# Patient Record
Sex: FEMALE | Race: WHITE | Employment: PART TIME | URBAN - METROPOLITAN AREA
[De-identification: names, ages, dates, MRNs, and addresses within clinical notes are randomized per-mention and may not be internally consistent; named-entity substitution may affect disease eponyms.]

---

## 2017-03-13 ENCOUNTER — TELEPHONE (OUTPATIENT)
Dept: INTERNAL MEDICINE CLINIC | Facility: CLINIC | Age: 37
End: 2017-03-13

## 2017-03-13 DIAGNOSIS — E05.90 HYPERTHYROIDISM: Primary | ICD-10-CM

## 2017-03-28 ENCOUNTER — HOSPITAL ENCOUNTER (OUTPATIENT)
Age: 37
Discharge: HOME | End: 2017-03-28
Attending: EMERGENCY MEDICINE
Payer: MEDICAID

## 2017-03-28 ENCOUNTER — APPOINTMENT (OUTPATIENT)
Dept: LAB | Age: 37
End: 2017-03-28
Attending: INTERNAL MEDICINE
Payer: MEDICAID

## 2017-03-28 ENCOUNTER — TELEPHONE (OUTPATIENT)
Dept: ENDOCRINOLOGY CLINIC | Facility: CLINIC | Age: 37
End: 2017-03-28

## 2017-03-28 VITALS
DIASTOLIC BLOOD PRESSURE: 80 MMHG | OXYGEN SATURATION: 100 % | BODY MASS INDEX: 26.58 KG/M2 | TEMPERATURE: 98 F | RESPIRATION RATE: 18 BRPM | WEIGHT: 150 LBS | SYSTOLIC BLOOD PRESSURE: 108 MMHG | HEIGHT: 63 IN | HEART RATE: 64 BPM

## 2017-03-28 DIAGNOSIS — R42 VERTIGO: ICD-10-CM

## 2017-03-28 DIAGNOSIS — E89.0 POSTOPERATIVE HYPOTHYROIDISM: ICD-10-CM

## 2017-03-28 DIAGNOSIS — E03.9 HYPOTHYROIDISM, UNSPECIFIED TYPE: Primary | ICD-10-CM

## 2017-03-28 LAB
T4 FREE SERPL-MCNC: 0.27 NG/DL (ref 0.58–1.64)
TSH SERPL-ACNC: 208.88 UIU/ML (ref 0.34–5.6)

## 2017-03-28 PROCEDURE — 84439 ASSAY OF FREE THYROXINE: CPT

## 2017-03-28 PROCEDURE — 99213 OFFICE O/P EST LOW 20 MIN: CPT

## 2017-03-28 PROCEDURE — 36415 COLL VENOUS BLD VENIPUNCTURE: CPT

## 2017-03-28 PROCEDURE — 84443 ASSAY THYROID STIM HORMONE: CPT

## 2017-03-28 PROCEDURE — 99214 OFFICE O/P EST MOD 30 MIN: CPT

## 2017-03-28 RX ORDER — NAPROXEN 500 MG/1
500 TABLET ORAL 2 TIMES DAILY PRN
Qty: 14 TABLET | Refills: 0 | Status: ON HOLD | OUTPATIENT
Start: 2017-03-28 | End: 2017-03-31

## 2017-03-28 RX ORDER — LEVOTHYROXINE SODIUM 112 UG/1
112 TABLET ORAL
Qty: 30 TABLET | Refills: 3 | Status: ON HOLD | OUTPATIENT
Start: 2017-03-28 | End: 2017-03-31

## 2017-03-28 RX ORDER — MECLIZINE HCL 12.5 MG/1
25 TABLET ORAL 3 TIMES DAILY PRN
Qty: 20 TABLET | Refills: 0 | Status: SHIPPED | OUTPATIENT
Start: 2017-03-28 | End: 2017-05-03

## 2017-03-28 NOTE — ED NOTES
Concerned about \"thyroid problem\". Without her meds for several weeks. Called today and her doctor is out of town. Told by office nurse to come here for evaluation.

## 2017-03-28 NOTE — ED INITIAL ASSESSMENT (HPI)
3/27/17 c/o dizzy, headache, fatigued. No fever. Denies congestion or sore throat. No N/V/D. Took claritin for \"allergy symptoms\". Ran out of thyroid meds several weeks ago. Dr. Keller Sink out of town.

## 2017-03-28 NOTE — TELEPHONE ENCOUNTER
Returned call to Jose Marlow. She states she was supposed to repeat labs and follow up with our office in December but did not follow up at that time.  She was feeling for a while that she might by hyperthyroid- having hot flashes and anxiety so decreased dose o

## 2017-03-28 NOTE — TELEPHONE ENCOUNTER
Returned call to [facility]. She went for bloodwork and was also seen in urgent care. They told her she is likely experiencing symptoms of hypothyroidism given she has been off medication for several weeks.  Informed patient she should be sure to restart medica

## 2017-03-28 NOTE — TELEPHONE ENCOUNTER
Pt. States that she is not feeling well. She states that she is feeling extremely lethargic, mouth is numb, and having a really bad headache. Pt. States that she is not sure if it is because of not taking her Levothyroxine medication?  I transferred the charley

## 2017-03-28 NOTE — ED PROVIDER NOTES
[facility]    History   Patient presents with:  Dizziness    Stated Complaint: TIRED    HPI    20-year-old female patient presents complaining of generalized weakness, mild low back pain and diffuse body times daily.    misoprostol (CYTOTEC) 200 MCG Oral Tab,  Take 2 tablets night prior to procedure       Family History   Problem Relation Age of Onset   • Lipids Father    • Hypertension Father    • Heart Disease Father      CAD; per NG   • Heart Disorder Pa reflexes, cerebellar exam.  Psych: Appropriate, not agitated      ED Course   Labs Reviewed - No data to display    MDM     I explained the patient to go to emergency department immediately if she has worsening symptoms, shortness of breath, dizziness or a

## 2017-03-29 ENCOUNTER — APPOINTMENT (OUTPATIENT)
Dept: CT IMAGING | Facility: HOSPITAL | Age: 37
DRG: 644 | End: 2017-03-29
Attending: EMERGENCY MEDICINE
Payer: MEDICAID

## 2017-03-29 ENCOUNTER — HOSPITAL ENCOUNTER (INPATIENT)
Facility: HOSPITAL | Age: 37
LOS: 2 days | Discharge: HOME | DRG: 644 | End: 2017-03-31
Attending: EMERGENCY MEDICINE | Admitting: HOSPITALIST
Payer: MEDICAID

## 2017-03-29 DIAGNOSIS — N17.9 ACUTE RENAL FAILURE, UNSPECIFIED ACUTE RENAL FAILURE TYPE (HCC): ICD-10-CM

## 2017-03-29 DIAGNOSIS — R53.1 WEAKNESS: ICD-10-CM

## 2017-03-29 DIAGNOSIS — E03.9 HYPOTHYROIDISM, UNSPECIFIED TYPE: Primary | ICD-10-CM

## 2017-03-29 PROCEDURE — 99220 INITIAL OBSERVATION CARE,LEVL III: CPT | Performed by: HOSPITALIST

## 2017-03-29 PROCEDURE — 70450 CT HEAD/BRAIN W/O DYE: CPT

## 2017-03-29 RX ORDER — MECLIZINE HYDROCHLORIDE 25 MG/1
25 TABLET ORAL 3 TIMES DAILY PRN
Status: DISCONTINUED | OUTPATIENT
Start: 2017-03-29 | End: 2017-03-31

## 2017-03-29 RX ORDER — POTASSIUM CHLORIDE 20 MEQ/1
40 TABLET, EXTENDED RELEASE ORAL ONCE
Status: COMPLETED | OUTPATIENT
Start: 2017-03-29 | End: 2017-03-29

## 2017-03-29 RX ORDER — ACETAMINOPHEN 325 MG/1
650 TABLET ORAL EVERY 4 HOURS PRN
Status: DISCONTINUED | OUTPATIENT
Start: 2017-03-29 | End: 2017-03-31

## 2017-03-29 RX ORDER — ONDANSETRON 2 MG/ML
4 INJECTION INTRAMUSCULAR; INTRAVENOUS EVERY 6 HOURS PRN
Status: DISCONTINUED | OUTPATIENT
Start: 2017-03-29 | End: 2017-03-31

## 2017-03-29 RX ORDER — LIOTHYRONINE SODIUM 5 UG/1
5 TABLET ORAL 2 TIMES DAILY
Status: DISCONTINUED | OUTPATIENT
Start: 2017-03-29 | End: 2017-03-30

## 2017-03-29 RX ORDER — HEPARIN SODIUM 5000 [USP'U]/ML
5000 INJECTION, SOLUTION INTRAVENOUS; SUBCUTANEOUS EVERY 12 HOURS
Status: DISCONTINUED | OUTPATIENT
Start: 2017-03-29 | End: 2017-03-31

## 2017-03-29 RX ORDER — SODIUM CHLORIDE 450 MG/100ML
INJECTION, SOLUTION INTRAVENOUS CONTINUOUS
Status: DISCONTINUED | OUTPATIENT
Start: 2017-03-29 | End: 2017-03-31

## 2017-03-29 NOTE — TELEPHONE ENCOUNTER
Dr. Erwin Ariza please review below. Spoke to you yesterday regarding this patient's symptoms. I did inform her she needs to restart LT4 right away given symptoms. See test results done yesterday.  Does anything else need to be done or just restarting LT4 th

## 2017-03-29 NOTE — TELEPHONE ENCOUNTER
Results for Romain Daughters (MRN [id number]) as of 3/29/2017 09:18   Ref.  Range 10/6/2016 14:16 3/28/2017 16:40   T4,Free (Direct) Latest Ref Range: 0.58-1.64 ng/dL 0.72 0.27 (L)   TSH Latest Ref Range: 0.34-5.60 uIU/mL 13.48 (H) 208.88 (H)   T3 FREE Late

## 2017-03-29 NOTE — ED PROVIDER NOTES
Patient Seen in: [facility] AND [facility] Emergency Department    History   Patient presents with:  Abnormal Result (metabolic, cardiac)  Numbness Weakness (neurologic)    Stated Complaint:     HPI    Patient presents the emergency department complaining of wea Lipids Father    • Hypertension Father    • Heart Disease Father      CAD; per NG   • Heart Disorder Paternal Grandfather    • Pulmonary Disease Mother      COPD; per NG   • Blood Disorder Mother      Bone Marrow Problem; per NG   • Cataracts Mother    • K place, and time. No cranial nerve deficit or sensory deficit. Skin: Skin is warm and dry. No rash noted. Nursing note and vitals reviewed.           ED Course     Labs Reviewed   BASIC METABOLIC PANEL (8) - Abnormal; Notable for the following:     GFR, diagnosis)  Weakness    Disposition:  Admit    Follow-up:  No follow-up provider specified.     Medications Prescribed:  Current Discharge Medication List        Present on Admission  Date Reviewed: 4/27/2016          ICD-10-CM Noted POA    Hypothyroidism E

## 2017-03-29 NOTE — ED INITIAL ASSESSMENT (HPI)
Patient sent over by PMD for abnormal TSH level. Patient c/o generalized weakness, headache worsening over the last 2-3 days.

## 2017-03-29 NOTE — H&P
[facility]    PATIENT'S NAME: Arclifford Katelyn   ATTENDING PHYSICIAN: Fazal Amin MD   PATIENT ACCOUNT#:   [id number]    LOCATION:  [place] [address]  MEDICAL RECORD #:   [id number]       YOB: 1980  ADMISSION DATE:       03/29/ blood pressure 117/81, pulse oximetry 99% on room air. HEENT:  Atraumatic. Oropharynx clear with dry mucous membranes. Normal hard and soft palate. NECK:  Trachea midline. Full range of motion. Supple. No thyromegaly or lymphadenopathy.     LUNGS:

## 2017-03-29 NOTE — TELEPHONE ENCOUNTER
Spoke with Freddy Varghese. She states she is feeling worse this morning. Discussed that TSH very elevated and per AM's advice patient should to to ED to be evaluated as she may need IV LT4 to help improve levels.  Patient is currently going to call Boyfriend to ran

## 2017-03-30 PROCEDURE — 99233 SBSQ HOSP IP/OBS HIGH 50: CPT | Performed by: HOSPITALIST

## 2017-03-30 PROCEDURE — 99222 1ST HOSP IP/OBS MODERATE 55: CPT | Performed by: INTERNAL MEDICINE

## 2017-03-30 NOTE — PROGRESS NOTES
[facility]    Progress Note    Aditi Perez Patient Status:  Inpatient    1980 MRN [id number]   Location [facility] 5SW/SE Attending Ronold Castleman, MD   Hosp Day # 1 PCP Keith Huston MD       Subjective:   Joceline Gallego Brain Or Head ([id number])    3/29/2017  CONCLUSION: Normal examination.                   Assessment and Plan:     Hypothyroidism, unspecified type  -untreated  -due to gut edema treating with iv replacement x 2-3 days  -transition to oral meds per endo    Weak

## 2017-03-30 NOTE — PROGRESS NOTES
[facility]    Progress Note    Aditi Perez Patient Status:  Inpatient    1980 MRN [id number]   Location [facility] 5SW/SE Attending Ronold Castleman, MD   Hosp Day # 1 PCP Keith Huston MD     Subjective:  Feels lexi

## 2017-03-30 NOTE — PLAN OF CARE
Problem: Patient Centered Care  Goal: Patient preferences are identified and integrated in the patient’s plan of care  Interventions:  - What would you like us to know as we care for you?  Surgical history-Thyroidectomy  - Provide timely, complete, and accu and behaviors that affect risk of falls.   - [place] fall precautions as indicated by assessment.  - Educate pt/family on patient safety including physical limitations  - Instruct pt to call for assistance with activity based on assessment  - Modify envir

## 2017-03-30 NOTE — CONSULTS
[facility]    Report of Consultation    Hodan Quitter Patient Status:  Inpatient    1980 MRN [id number]   Location [facility] 5SW/SE Attending Gale Bernard MD   Hosp Day # 1 PCP Karrie Butler MD     Date of Admissi Disease Sister      Kidney stones; per NG   • Anemia Brother      per NG   • Glaucoma Neg    • Diabetes Neg    • Macular degeneration Neg    • Cancer Maternal Grandmother       reports that she quit smoking about 12 years ago.  Her smoking use included Ciga headaches     Nausea     Eye pain     Pelvic pain in female     Bilateral ovarian cysts     Hot flashes     Physical exam, annual     Menopause     Heart palpitations     Anxiety     MGD (meibomian gland disease)     Anisometropic amblyopia of right eye

## 2017-03-30 NOTE — PLAN OF CARE
Pharmacist notified RN that Liothyroine 5 mcg is not in stock. Patient asked to bring in home dose in original packaging. Patient agreeable to plan. Will have family bring home dose in.

## 2017-03-30 NOTE — PLAN OF CARE
Problem: Patient Centered Care  Goal: Patient preferences are identified and integrated in the patient’s plan of care  Interventions:  - What would you like us to know as we care for you?  Surgical history-Thyroidectomy  - Provide timely, complete, and accu but appears more comfortable with PRN meds. Problem: SAFETY ADULT - FALL  Goal: Free from fall injury  INTERVENTIONS:  - Assess pt frequently for physical needs  - Identify cognitive and physical deficits and behaviors that affect risk of falls.   - Inst

## 2017-03-31 VITALS
SYSTOLIC BLOOD PRESSURE: 108 MMHG | TEMPERATURE: 98 F | RESPIRATION RATE: 17 BRPM | BODY MASS INDEX: 26.58 KG/M2 | OXYGEN SATURATION: 97 % | HEIGHT: 63 IN | DIASTOLIC BLOOD PRESSURE: 61 MMHG | HEART RATE: 66 BPM | WEIGHT: 150 LBS

## 2017-03-31 PROCEDURE — 99231 SBSQ HOSP IP/OBS SF/LOW 25: CPT | Performed by: INTERNAL MEDICINE

## 2017-03-31 PROCEDURE — 99239 HOSP IP/OBS DSCHRG MGMT >30: CPT | Performed by: HOSPITALIST

## 2017-03-31 RX ORDER — HYDROCODONE BITARTRATE AND ACETAMINOPHEN 7.5; 325 MG/1; MG/1
1 TABLET ORAL EVERY 6 HOURS PRN
Status: DISCONTINUED | OUTPATIENT
Start: 2017-03-31 | End: 2017-03-31

## 2017-03-31 RX ORDER — ACETAMINOPHEN 325 MG/1
650 TABLET ORAL EVERY 4 HOURS PRN
Qty: 30 TABLET | Refills: 0 | Status: SHIPPED | OUTPATIENT
Start: 2017-03-31

## 2017-03-31 RX ORDER — LEVOTHYROXINE SODIUM 112 UG/1
112 TABLET ORAL
Qty: 30 TABLET | Refills: 3 | Status: SHIPPED | OUTPATIENT
Start: 2017-03-31 | End: 2018-08-02

## 2017-03-31 NOTE — PROGRESS NOTES
[facility]    Progress Note    Tanya Martins Patient Status:  Inpatient    1980 MRN [id number]   Location [facility] 5SW/SE Attending Sonny Sprague MD   Hosp Day # 2 PCP Shaye Martel MD       Subjective:   Jeanine Cross  03/31/2017       Ct Brain Or Head ([id number])    3/29/2017  CONCLUSION: Normal examination.                   Assessment and Plan:   Hypothyroidism, unspecified type  -untreated for months  -due to gut edema treating with iv replacement x 2-3 days  -tr

## 2017-03-31 NOTE — PLAN OF CARE
Pt discharged home in stable condition with all of her belongings accompanied by rocael. Discharge instructions given to pt/kingsleyance. Pt verbalized understanding.  IV and Tele discontinued

## 2017-03-31 NOTE — PLAN OF CARE
Problem: Patient Centered Care  Goal: Patient preferences are identified and integrated in the patient’s plan of care  Interventions:  - What would you like us to know as we care for you?  Surgical history-Thyroidectomy  - Provide timely, complete, and accu ADULT - FALL  Goal: Free from fall injury  INTERVENTIONS:  - Assess pt frequently for physical needs  - Identify cognitive and physical deficits and behaviors that affect risk of falls.   - [place] fall precautions as indicated by assessment.  - Educate p

## 2017-03-31 NOTE — PROGRESS NOTES
39year old female with uncontrolled hypothyroidism secondary to non compliance  She has been started on iv synthroid 100 mcg daily. We will repeat FT 4 on Sunday. We will follow.

## 2017-03-31 NOTE — PAYOR COMM NOTE
Review Type: ADMISSION   Reviewer: Augie Marroquin       Date: March 31, 2017 - 11:09 AM  Payor: Flaco Huynh  Authorization Number: N/A  Admit date: 3/29/2017  1:54 PM   Admitted from Emergency Dept.:       Patient Seen in: [address] [place] [address] BASIC METABOLIC PANEL (8) - Abnormal; Notable for the following:     GFR, Non- 43 (*)     GFR, -American 52 (*)     All other components within normal limits   TSH W REFLEX TO FREE T4 - Abnormal; Notable for the following:     TSH activities of daily living. REVIEW OF SYSTEMS:  Generalized fatigue, weakness, decreased energy, hypersomnia, intolerance to cold, constipation. Patient denies any fever or chills. No chest pain. Other 12-point review of systems is negative. clear with dry mucous membranes.  Normal hard and soft palate.     NECK:  Trachea midline.  Full range of motion.  Supple.  No thyromegaly or lymphadenopathy.     LUNGS:  Clear to auscultation bilaterally.  Normal respiratory effort.  No intercostal retrac medication, given that patient's with uncontrolled hypothyroidism can have gut edema which impairs absorption of oral medication     PLAN:  - c/w synthroid 100 mcg iv  - Will plan to transition to oral therapy after 2-3 days      3/31/17  Subjective:    Al treating with iv replacement x 2-3 days  -transition to oral meds per endo    Weakness  -secondary to above    Acute Renal Failure  -likely related pre-renal vs. Mild rhabdomyolysis  -cpk slightly elevated  -was on a hike prior to symtpoms  -cont to helena

## 2017-03-31 NOTE — DISCHARGE SUMMARY
[facility] AND [facility]  Discharge Summary    Kelley Silva Patient Status:  Inpatient    1980 MRN [id number]   Location [facility] 5SW/SE Attending Theodora Birmingham MD   Hosp Day # 2 PCP Tunde Jacobo MD     Date of Admission: 3/29/2017    D Dr. Vicente Alonso ADMISSION:  Hypothyroidism, bradycardia, and mild renal insufficiency.      HISTORY OF PRESENT ILLNESS:  The patient is a 43-year-old  female who stopped taking her thyroid medications because she felt hyperthyroid and h

## 2017-04-01 ENCOUNTER — TELEPHONE (OUTPATIENT)
Dept: ENDOCRINOLOGY CLINIC | Facility: CLINIC | Age: 37
End: 2017-04-01

## 2017-04-01 DIAGNOSIS — E03.9 HYPOTHYROIDISM, UNSPECIFIED TYPE: Primary | ICD-10-CM

## 2017-04-03 ENCOUNTER — APPOINTMENT (OUTPATIENT)
Dept: LAB | Age: 37
End: 2017-04-03
Attending: INTERNAL MEDICINE
Payer: MEDICAID

## 2017-04-03 ENCOUNTER — TELEPHONE (OUTPATIENT)
Dept: INTERNAL MEDICINE UNIT | Facility: HOSPITAL | Age: 37
End: 2017-04-03

## 2017-04-03 ENCOUNTER — TELEPHONE (OUTPATIENT)
Dept: ENDOCRINOLOGY CLINIC | Facility: CLINIC | Age: 37
End: 2017-04-03

## 2017-04-03 DIAGNOSIS — E03.9 HYPOTHYROIDISM, UNSPECIFIED TYPE: ICD-10-CM

## 2017-04-03 PROCEDURE — 84439 ASSAY OF FREE THYROXINE: CPT

## 2017-04-03 PROCEDURE — 36415 COLL VENOUS BLD VENIPUNCTURE: CPT

## 2017-04-03 NOTE — TELEPHONE ENCOUNTER
Reviewed patient's recent hospitalization due to severe hypothyroidism, please make sure that she is going for lab this week which was ordered by Dr. Margaux Sanchez.

## 2017-04-03 NOTE — TELEPHONE ENCOUNTER
Patient discharged from [facility] on March 31, 2017. Please call patient to schedule hospital follow-up appointment with PCP, Dr. Lorraine Tomlinson or whom ever the patient wishes to follow-up with.

## 2017-04-05 ENCOUNTER — TELEPHONE (OUTPATIENT)
Dept: ENDOCRINOLOGY CLINIC | Facility: CLINIC | Age: 37
End: 2017-04-05

## 2017-04-05 DIAGNOSIS — E03.9 HYPOTHYROIDISM, UNSPECIFIED TYPE: Primary | ICD-10-CM

## 2017-04-05 NOTE — TELEPHONE ENCOUNTER
Please call patient - thyroid level is significantly improved. Continue current dose of medication and repeat TSH, FT4 before upcoming appointment.

## 2017-04-05 NOTE — TELEPHONE ENCOUNTER
Called Anneliese Perez and let her know per [facility] thyroid levels are significantly improved. Understands to continue current dose of medication and will repeat labs before upcoming appt. Orders replaced.

## 2017-04-07 ENCOUNTER — LAB ENCOUNTER (OUTPATIENT)
Dept: LAB | Age: 37
End: 2017-04-07
Attending: INTERNAL MEDICINE
Payer: MEDICAID

## 2017-04-07 ENCOUNTER — OFFICE VISIT (OUTPATIENT)
Dept: INTERNAL MEDICINE CLINIC | Facility: CLINIC | Age: 37
End: 2017-04-07

## 2017-04-07 ENCOUNTER — HOSPITAL ENCOUNTER (OUTPATIENT)
Dept: GENERAL RADIOLOGY | Age: 37
Discharge: HOME | End: 2017-04-07
Attending: INTERNAL MEDICINE
Payer: MEDICAID

## 2017-04-07 VITALS
SYSTOLIC BLOOD PRESSURE: 114 MMHG | WEIGHT: 154 LBS | BODY MASS INDEX: 27 KG/M2 | DIASTOLIC BLOOD PRESSURE: 76 MMHG | RESPIRATION RATE: 18 BRPM | TEMPERATURE: 98 F | HEART RATE: 71 BPM

## 2017-04-07 DIAGNOSIS — E03.9 HYPOTHYROIDISM, UNSPECIFIED TYPE: Primary | ICD-10-CM

## 2017-04-07 DIAGNOSIS — E03.9 HYPOTHYROIDISM, UNSPECIFIED TYPE: ICD-10-CM

## 2017-04-07 DIAGNOSIS — S16.1XXA CERVICAL MYOFASCIAL STRAIN, INITIAL ENCOUNTER: ICD-10-CM

## 2017-04-07 DIAGNOSIS — M54.41 CHRONIC RIGHT-SIDED LOW BACK PAIN WITH RIGHT-SIDED SCIATICA: ICD-10-CM

## 2017-04-07 DIAGNOSIS — G89.29 CHRONIC RIGHT-SIDED LOW BACK PAIN WITH RIGHT-SIDED SCIATICA: ICD-10-CM

## 2017-04-07 DIAGNOSIS — S29.019A STRAIN OF THORACIC REGION, INITIAL ENCOUNTER: ICD-10-CM

## 2017-04-07 DIAGNOSIS — E55.9 VITAMIN D DEFICIENCY: ICD-10-CM

## 2017-04-07 PROCEDURE — 82306 VITAMIN D 25 HYDROXY: CPT

## 2017-04-07 PROCEDURE — 84439 ASSAY OF FREE THYROXINE: CPT

## 2017-04-07 PROCEDURE — 36415 COLL VENOUS BLD VENIPUNCTURE: CPT

## 2017-04-07 PROCEDURE — 84443 ASSAY THYROID STIM HORMONE: CPT

## 2017-04-07 PROCEDURE — 99212 OFFICE O/P EST SF 10 MIN: CPT | Performed by: INTERNAL MEDICINE

## 2017-04-07 PROCEDURE — 99214 OFFICE O/P EST MOD 30 MIN: CPT | Performed by: INTERNAL MEDICINE

## 2017-04-07 PROCEDURE — 80053 COMPREHEN METABOLIC PANEL: CPT

## 2017-04-07 PROCEDURE — 72080 X-RAY EXAM THORACOLMB 2/> VW: CPT

## 2017-04-07 PROCEDURE — 82607 VITAMIN B-12: CPT

## 2017-04-07 PROCEDURE — 82550 ASSAY OF CK (CPK): CPT

## 2017-04-07 NOTE — PROGRESS NOTES
HPI:    Patient ID: Thee Finney is a 39year old female patient presents for follow-up after being hospitalized recently    HPI  Patient reports that she was hospitalized recently, she is having hard time with hypothyroidism due to [facility] thyroiditi Positive for weakness. Negative for tremors. Psychiatric/Behavioral: Negative for sleep disturbance. The patient is not nervous/anxious.              Current Outpatient Prescriptions:  acetaminophen 325 MG Oral Tab Take 2 tablets (650 mg total) by mouth e for adjustment of levothyroxine.   Advised patient that many symptoms she has could be related to hypothyroidism  Vitamin d deficiency  Chronic right-sided low back pain with right-sided sciatica advised patient to start physical therapy, follow-up if no im

## 2017-04-09 ENCOUNTER — TELEPHONE (OUTPATIENT)
Dept: INTERNAL MEDICINE CLINIC | Facility: CLINIC | Age: 37
End: 2017-04-09

## 2017-04-09 DIAGNOSIS — K80.20 CALCULUS OF GALLBLADDER WITHOUT CHOLECYSTITIS WITHOUT OBSTRUCTION: Primary | ICD-10-CM

## 2017-04-10 NOTE — TELEPHONE ENCOUNTER
Advised patient of Dr. Lawrence Hernandez notes. Gave patient number to scheduling. Patient verbalized understanding.

## 2017-04-10 NOTE — TELEPHONE ENCOUNTER
Notes Recorded by Bret Zavala MD on 4/9/2017 at 1:49 PM  See x-ray results as well  Notes Recorded by Bret Zavala MD on 4/9/2017 at 1:47 PM  Call patient thyroid function is improving, vitamin B12 low normal, I recommend that she starts taking over-t

## 2017-04-10 NOTE — TELEPHONE ENCOUNTER
----- Message from Adrienne An MD sent at 4/9/2017  1:48 PM CDT -----  X-ray of the thoracic spine is normal, incidentally DC that she might hear a stones in the gallbladder, I recommend that she does ultrasound just to document that I placed order she s

## 2017-04-13 ENCOUNTER — APPOINTMENT (OUTPATIENT)
Dept: LAB | Age: 37
End: 2017-04-13
Attending: INTERNAL MEDICINE
Payer: MEDICAID

## 2017-04-13 DIAGNOSIS — E03.9 HYPOTHYROIDISM, UNSPECIFIED TYPE: ICD-10-CM

## 2017-04-13 PROCEDURE — 36415 COLL VENOUS BLD VENIPUNCTURE: CPT

## 2017-04-13 PROCEDURE — 84439 ASSAY OF FREE THYROXINE: CPT

## 2017-04-13 PROCEDURE — 84443 ASSAY THYROID STIM HORMONE: CPT

## 2017-04-14 ENCOUNTER — OFFICE VISIT (OUTPATIENT)
Dept: ENDOCRINOLOGY CLINIC | Facility: CLINIC | Age: 37
End: 2017-04-14

## 2017-04-14 VITALS
HEIGHT: 63.5 IN | HEART RATE: 69 BPM | WEIGHT: 155.19 LBS | SYSTOLIC BLOOD PRESSURE: 103 MMHG | DIASTOLIC BLOOD PRESSURE: 65 MMHG | BODY MASS INDEX: 27.16 KG/M2

## 2017-04-14 DIAGNOSIS — E89.0 POSTOPERATIVE HYPOTHYROIDISM: Primary | ICD-10-CM

## 2017-04-14 PROCEDURE — 99212 OFFICE O/P EST SF 10 MIN: CPT | Performed by: INTERNAL MEDICINE

## 2017-04-14 PROCEDURE — 99213 OFFICE O/P EST LOW 20 MIN: CPT | Performed by: INTERNAL MEDICINE

## 2017-04-14 NOTE — PROGRESS NOTES
Name: Kelly Soni  Date: 4/14/2017    Referring Physician: Vida Morales    No chief complaint on file. HISTORY OF PRESENT ILLNESS   Kelly Soni is a 39year old female who presents for No chief complaint on file.     40 y/o F presents for fo pain or arthralgia  /Gyne: no frequency or discomfort while urinating  Psychiatric:  no acute distress, anxiety  or depression  Skin: normal moisturized skin    Medications:     Current outpatient prescriptions:   •  acetaminophen 325 MG Oral Tab, Take 2 LAPAROSCOPY,DIAGNOSTIC  08/2000    Comment Ectopic pregnancy       PHYSICAL EXAMINATION:  There were no vitals taken for this visit. General Appearance:  Alert, in no acute distress, well developed  Eyes: normal conjunctivae, sclera.   Ears/Nose/Mouth/Th

## 2017-04-26 ENCOUNTER — HOSPITAL ENCOUNTER (OUTPATIENT)
Dept: ULTRASOUND IMAGING | Age: 37
Discharge: HOME | End: 2017-04-26
Attending: INTERNAL MEDICINE
Payer: MEDICAID

## 2017-04-26 DIAGNOSIS — K80.20 CALCULUS OF GALLBLADDER WITHOUT CHOLECYSTITIS WITHOUT OBSTRUCTION: ICD-10-CM

## 2017-04-26 PROCEDURE — 76705 ECHO EXAM OF ABDOMEN: CPT

## 2017-05-03 ENCOUNTER — OFFICE VISIT (OUTPATIENT)
Dept: INTERNAL MEDICINE CLINIC | Facility: CLINIC | Age: 37
End: 2017-05-03

## 2017-05-03 VITALS
SYSTOLIC BLOOD PRESSURE: 108 MMHG | DIASTOLIC BLOOD PRESSURE: 72 MMHG | WEIGHT: 155 LBS | TEMPERATURE: 98 F | RESPIRATION RATE: 18 BRPM | BODY MASS INDEX: 27 KG/M2 | OXYGEN SATURATION: 96 % | HEART RATE: 67 BPM

## 2017-05-03 DIAGNOSIS — S29.019A STRAIN OF THORACIC REGION, INITIAL ENCOUNTER: ICD-10-CM

## 2017-05-03 DIAGNOSIS — K59.00 CONSTIPATION, UNSPECIFIED CONSTIPATION TYPE: ICD-10-CM

## 2017-05-03 DIAGNOSIS — R10.11 RIGHT UPPER QUADRANT ABDOMINAL PAIN: Primary | ICD-10-CM

## 2017-05-03 PROCEDURE — 99212 OFFICE O/P EST SF 10 MIN: CPT | Performed by: INTERNAL MEDICINE

## 2017-05-03 PROCEDURE — 99213 OFFICE O/P EST LOW 20 MIN: CPT | Performed by: INTERNAL MEDICINE

## 2017-05-03 RX ORDER — POLYETHYLENE GLYCOL 3350 17 G/17G
17 POWDER, FOR SOLUTION ORAL 2 TIMES DAILY
Qty: 60 PACKET | Refills: 1 | Status: SHIPPED | OUTPATIENT
Start: 2017-05-03

## 2017-05-04 NOTE — PROGRESS NOTES
HPI:    Patient ID: Hodan Ceballos is a 39year old female presents for follow-up with constipations, back pains. HPI  Patient is being treated for hypothyroidism, he levels a improving, she states that she is feeling better energy level is improving. tablet Rfl: 3     Allergies:No Known Allergies   /72 mmHg  Pulse 67  Temp(Src) 98.2 °F (36.8 °C) (Oral)  Resp 18  Wt 155 lb (70.308 kg)  SpO2 96%   Physical Exam    Constitutional: She is oriented to person, place, and time.  She appears well-develope INTERNAL         FR#[id number]

## 2017-11-01 RX ORDER — LEVOTHYROXINE SODIUM 112 UG/1
TABLET ORAL
Qty: 30 TABLET | Refills: 0 | Status: SHIPPED | OUTPATIENT
Start: 2017-11-01 | End: 2017-12-01

## 2017-12-01 RX ORDER — LEVOTHYROXINE SODIUM 112 UG/1
TABLET ORAL
Qty: 30 TABLET | Refills: 3 | Status: SHIPPED | OUTPATIENT
Start: 2017-12-01 | End: 2019-05-17

## 2018-08-01 RX ORDER — LEVOTHYROXINE SODIUM 112 UG/1
TABLET ORAL
Qty: 90 TABLET | Refills: 0 | OUTPATIENT
Start: 2018-08-01

## 2018-08-01 RX ORDER — LEVOTHYROXINE SODIUM 112 UG/1
TABLET ORAL
Qty: 30 TABLET | Refills: 0 | OUTPATIENT
Start: 2018-08-01

## 2018-08-01 NOTE — TELEPHONE ENCOUNTER
Pt calling back - scheduled appt for 9/28- asking for refill before she goes on trip - she has 4 pills left

## 2018-08-02 RX ORDER — LEVOTHYROXINE SODIUM 112 UG/1
112 TABLET ORAL
Qty: 30 TABLET | Refills: 1 | Status: SHIPPED | OUTPATIENT
Start: 2018-08-02 | End: 2018-09-04

## 2018-09-05 RX ORDER — LEVOTHYROXINE SODIUM 112 UG/1
TABLET ORAL
Qty: 30 TABLET | Refills: 0 | Status: SHIPPED | OUTPATIENT
Start: 2018-09-05 | End: 2018-10-31

## 2018-10-31 RX ORDER — LEVOTHYROXINE SODIUM 112 UG/1
TABLET ORAL
Qty: 30 TABLET | Refills: 5 | Status: SHIPPED | OUTPATIENT
Start: 2018-10-31 | End: 2019-05-04

## 2019-05-10 RX ORDER — LEVOTHYROXINE SODIUM 112 UG/1
TABLET ORAL
Qty: 30 TABLET | Refills: 0 | Status: SHIPPED | OUTPATIENT
Start: 2019-05-10 | End: 2019-05-17

## 2019-05-10 NOTE — TELEPHONE ENCOUNTER
Follow up booked so sent prescription to last until scheduled per [facility] protocol.

## 2019-05-13 RX ORDER — LEVOTHYROXINE SODIUM 112 UG/1
TABLET ORAL
Qty: 30 TABLET | Refills: 2 | Status: SHIPPED | OUTPATIENT
Start: 2019-05-13 | End: 2019-05-17

## 2019-05-17 ENCOUNTER — OFFICE VISIT (OUTPATIENT)
Dept: ENDOCRINOLOGY CLINIC | Facility: CLINIC | Age: 39
End: 2019-05-17

## 2019-05-17 VITALS
SYSTOLIC BLOOD PRESSURE: 109 MMHG | BODY MASS INDEX: 25 KG/M2 | DIASTOLIC BLOOD PRESSURE: 70 MMHG | HEART RATE: 80 BPM | WEIGHT: 146 LBS

## 2019-05-17 DIAGNOSIS — E89.0 POSTOPERATIVE HYPOTHYROIDISM: Primary | ICD-10-CM

## 2019-05-17 PROCEDURE — 99213 OFFICE O/P EST LOW 20 MIN: CPT | Performed by: INTERNAL MEDICINE

## 2019-05-17 RX ORDER — LEVOTHYROXINE SODIUM 112 UG/1
TABLET ORAL
Qty: 30 TABLET | Refills: 11 | Status: SHIPPED | OUTPATIENT
Start: 2019-05-17 | End: 2021-01-25

## 2019-05-17 NOTE — PROGRESS NOTES
Name: Nica Nina  Date: 5/17/2019    Referring Physician: No ref. provider found    Patient presents with: Follow - Up: Thyroid. HISTORY OF PRESENT ILLNESS   Nica Nina is a 45year old female who presents for Patient presents with:   Follow Outpatient Medications:   •  LEVOTHYROXINE SODIUM 112 MCG Oral Tab, TAKE 1 TABLET(112 MCG) BY MOUTH BEFORE BREAKFAST, Disp: 30 tablet, Rfl: 2  •  LEVOTHYROXINE SODIUM 112 MCG Oral Tab, TAKE 1 TABLET(112 MCG) BY MOUTH BEFORE BREAKFAST, Disp: 30 tablet, Rfl: SURGERY     • LAPAROSCOPY,DIAGNOSTIC  08/2000    Ectopic pregnancy   • OTHER SURGICAL HISTORY  2000    \"Ectopic pregnancy: Surgical procedure\"; per NG       PHYSICAL EXAMINATION:  /70   Pulse 80   Wt 146 lb (66.2 kg)   BMI 25.46 kg/m²     General A

## 2019-07-16 ENCOUNTER — APPOINTMENT (OUTPATIENT)
Dept: CT IMAGING | Facility: HOSPITAL | Age: 39
End: 2019-07-16
Attending: NURSE PRACTITIONER

## 2019-07-16 ENCOUNTER — HOSPITAL ENCOUNTER (EMERGENCY)
Facility: HOSPITAL | Age: 39
Discharge: HOME | End: 2019-07-16

## 2019-07-16 ENCOUNTER — APPOINTMENT (OUTPATIENT)
Dept: MRI IMAGING | Facility: HOSPITAL | Age: 39
End: 2019-07-16
Attending: NURSE PRACTITIONER

## 2019-07-16 VITALS
WEIGHT: 140 LBS | OXYGEN SATURATION: 98 % | RESPIRATION RATE: 16 BRPM | BODY MASS INDEX: 24.8 KG/M2 | TEMPERATURE: 99 F | DIASTOLIC BLOOD PRESSURE: 81 MMHG | HEIGHT: 63 IN | HEART RATE: 76 BPM | SYSTOLIC BLOOD PRESSURE: 99 MMHG

## 2019-07-16 DIAGNOSIS — H93.11 TINNITUS OF RIGHT EAR: ICD-10-CM

## 2019-07-16 DIAGNOSIS — R51.9 HEADACHE DISORDER: ICD-10-CM

## 2019-07-16 DIAGNOSIS — H92.01 RIGHT EAR PAIN: Primary | ICD-10-CM

## 2019-07-16 LAB
ALBUMIN SERPL-MCNC: 3.9 G/DL (ref 3.4–5)
ALBUMIN/GLOB SERPL: 1.1 {RATIO} (ref 1–2)
ALP LIVER SERPL-CCNC: 91 U/L (ref 37–98)
ALT SERPL-CCNC: 27 U/L (ref 13–56)
ANION GAP SERPL CALC-SCNC: 7 MMOL/L (ref 0–18)
AST SERPL-CCNC: 22 U/L (ref 15–37)
B-HCG UR QL: NEGATIVE
BASOPHILS # BLD AUTO: 0.02 X10(3) UL (ref 0–0.2)
BASOPHILS NFR BLD AUTO: 0.3 %
BILIRUB SERPL-MCNC: 0.2 MG/DL (ref 0.1–2)
BILIRUB UR QL: NEGATIVE
BUN BLD-MCNC: 15 MG/DL (ref 7–18)
BUN/CREAT SERPL: 16.5 (ref 10–20)
CALCIUM BLD-MCNC: 8.3 MG/DL (ref 8.5–10.1)
CHLORIDE SERPL-SCNC: 106 MMOL/L (ref 98–112)
CLARITY UR: CLEAR
CO2 SERPL-SCNC: 30 MMOL/L (ref 21–32)
COLOR UR: COLORLESS
CREAT BLD-MCNC: 0.91 MG/DL (ref 0.55–1.02)
DEPRECATED RDW RBC AUTO: 40.2 FL (ref 35.1–46.3)
EOSINOPHIL # BLD AUTO: 0.29 X10(3) UL (ref 0–0.7)
EOSINOPHIL NFR BLD AUTO: 4.3 %
ERYTHROCYTE [DISTWIDTH] IN BLOOD BY AUTOMATED COUNT: 12.8 % (ref 11–15)
GLOBULIN PLAS-MCNC: 3.6 G/DL (ref 2.8–4.4)
GLUCOSE BLD-MCNC: 88 MG/DL (ref 70–99)
GLUCOSE UR-MCNC: NEGATIVE MG/DL
HCT VFR BLD AUTO: 36.9 % (ref 35–48)
HGB BLD-MCNC: 12.3 G/DL (ref 12–16)
HGB UR QL STRIP.AUTO: NEGATIVE
IMM GRANULOCYTES # BLD AUTO: 0.03 X10(3) UL (ref 0–1)
IMM GRANULOCYTES NFR BLD: 0.4 %
KETONES UR-MCNC: NEGATIVE MG/DL
LEUKOCYTE ESTERASE UR QL STRIP.AUTO: NEGATIVE
LYMPHOCYTES # BLD AUTO: 1.96 X10(3) UL (ref 1–4)
LYMPHOCYTES NFR BLD AUTO: 29.2 %
M PROTEIN MFR SERPL ELPH: 7.5 G/DL (ref 6.4–8.2)
MCH RBC QN AUTO: 28.8 PG (ref 26–34)
MCHC RBC AUTO-ENTMCNC: 33.3 G/DL (ref 31–37)
MCV RBC AUTO: 86.4 FL (ref 80–100)
MONOCYTES # BLD AUTO: 0.65 X10(3) UL (ref 0.1–1)
MONOCYTES NFR BLD AUTO: 9.7 %
NEUTROPHILS # BLD AUTO: 3.76 X10 (3) UL (ref 1.5–7.7)
NEUTROPHILS # BLD AUTO: 3.76 X10(3) UL (ref 1.5–7.7)
NEUTROPHILS NFR BLD AUTO: 56.1 %
NITRITE UR QL STRIP.AUTO: NEGATIVE
OSMOLALITY SERPL CALC.SUM OF ELEC: 296 MOSM/KG (ref 275–295)
PH UR: 7 [PH] (ref 5–8)
PLATELET # BLD AUTO: 238 10(3)UL (ref 150–450)
POTASSIUM SERPL-SCNC: 3.8 MMOL/L (ref 3.5–5.1)
PROT UR-MCNC: NEGATIVE MG/DL
RBC # BLD AUTO: 4.27 X10(6)UL (ref 3.8–5.3)
SODIUM SERPL-SCNC: 143 MMOL/L (ref 136–145)
SP GR UR STRIP: 1 (ref 1–1.03)
TSI SER-ACNC: 0.5 MIU/ML (ref 0.36–3.74)
UROBILINOGEN UR STRIP-ACNC: <2
VIT C UR-MCNC: NEGATIVE MG/DL
WBC # BLD AUTO: 6.7 X10(3) UL (ref 4–11)

## 2019-07-16 PROCEDURE — 70450 CT HEAD/BRAIN W/O DYE: CPT | Performed by: NURSE PRACTITIONER

## 2019-07-16 PROCEDURE — 81001 URINALYSIS AUTO W/SCOPE: CPT | Performed by: NURSE PRACTITIONER

## 2019-07-16 PROCEDURE — 70551 MRI BRAIN STEM W/O DYE: CPT | Performed by: NURSE PRACTITIONER

## 2019-07-16 PROCEDURE — 36415 COLL VENOUS BLD VENIPUNCTURE: CPT

## 2019-07-16 PROCEDURE — 81025 URINE PREGNANCY TEST: CPT

## 2019-07-16 PROCEDURE — 84443 ASSAY THYROID STIM HORMONE: CPT | Performed by: NURSE PRACTITIONER

## 2019-07-16 PROCEDURE — 85025 COMPLETE CBC W/AUTO DIFF WBC: CPT | Performed by: NURSE PRACTITIONER

## 2019-07-16 PROCEDURE — 80053 COMPREHEN METABOLIC PANEL: CPT | Performed by: NURSE PRACTITIONER

## 2019-07-16 PROCEDURE — 99284 EMERGENCY DEPT VISIT MOD MDM: CPT

## 2019-07-16 RX ORDER — MECLIZINE HYDROCHLORIDE 25 MG/1
25 TABLET ORAL 3 TIMES DAILY PRN
Qty: 20 TABLET | Refills: 0 | Status: SHIPPED | OUTPATIENT
Start: 2019-07-16

## 2019-07-16 NOTE — ED NOTES
Pt presents to ED with a c/o pain to right ear and right side of face. States she has been feeling \"foggy\" with intermittent balance issues for the past couple of weeks. Denies numbness, tingling, focal weakness, vision changes. Denies cp, sob.  Pt is aox

## 2019-07-16 NOTE — ED PROVIDER NOTES
Patient Seen in: [facility] Emergency Department    History   Patient presents with:  Ear Problem Pain (neurosensory)  Headache (neurologic)    Stated Complaint: R ear pain and head pain. No trauma within the last 30 days.      HPI    43-year-old f 5.00        Pack years: 1.25        Types: Cigarettes        Quit date: 2005        Years since quittin.5      Smokeless tobacco: Former User    Alcohol use: No      Comment: None.      Drug use: No      Review of Systems    Positive for stated com All other components within normal limits   TSH W REFLEX TO FREE T4 - Normal   EMH POCT PREGNANCY URINE - Normal   CBC WITH DIFFERENTIAL WITH PLATELET    Narrative: The following orders were created for panel order CBC WITH DIFFERENTIAL WITH PLATELET. verbalized understanding of the discharge instructions and plan                Disposition and Plan     Clinical Impression:  Right ear pain  (primary encounter diagnosis)  Tinnitus of right ear  Headache disorder    Disposition:  Discharge  7/16/2019  7:1

## 2019-09-11 RX ORDER — LEVOTHYROXINE SODIUM 112 UG/1
TABLET ORAL
Qty: 30 TABLET | Refills: 5 | Status: SHIPPED | OUTPATIENT
Start: 2019-09-11 | End: 2020-10-19

## 2020-02-12 ENCOUNTER — OFFICE VISIT (OUTPATIENT)
Dept: FAMILY MEDICINE CLINIC | Facility: CLINIC | Age: 40
End: 2020-02-12
Payer: COMMERCIAL

## 2020-02-12 ENCOUNTER — NURSE TRIAGE (OUTPATIENT)
Dept: OTHER | Age: 40
End: 2020-02-12

## 2020-02-12 VITALS
TEMPERATURE: 100 F | SYSTOLIC BLOOD PRESSURE: 120 MMHG | HEIGHT: 63 IN | HEART RATE: 92 BPM | DIASTOLIC BLOOD PRESSURE: 68 MMHG | WEIGHT: 154.31 LBS | BODY MASS INDEX: 27.34 KG/M2 | RESPIRATION RATE: 18 BRPM

## 2020-02-12 DIAGNOSIS — J02.0 STREP PHARYNGITIS: Primary | ICD-10-CM

## 2020-02-12 DIAGNOSIS — J02.9 SORE THROAT: ICD-10-CM

## 2020-02-12 DIAGNOSIS — J00 ACUTE RHINITIS: ICD-10-CM

## 2020-02-12 LAB
CONTROL LINE PRESENT WITH A CLEAR BACKGROUND (YES/NO): YES YES/NO
FLUAV + FLUBV RNA SPEC NAA+PROBE: NEGATIVE
KIT LOT #: ABNORMAL NUMERIC
STREP GRP A CUL-SCR: POSITIVE

## 2020-02-12 PROCEDURE — 99213 OFFICE O/P EST LOW 20 MIN: CPT | Performed by: PHYSICIAN ASSISTANT

## 2020-02-12 PROCEDURE — 87880 STREP A ASSAY W/OPTIC: CPT | Performed by: PHYSICIAN ASSISTANT

## 2020-02-12 RX ORDER — AMOXICILLIN AND CLAVULANATE POTASSIUM 875; 125 MG/1; MG/1
1 TABLET, FILM COATED ORAL 2 TIMES DAILY
Qty: 20 TABLET | Refills: 0 | Status: SHIPPED | OUTPATIENT
Start: 2020-02-12 | End: 2020-02-22

## 2020-02-12 NOTE — PROGRESS NOTES
HPI:     Sore Throat    This is a new problem. The current episode started yesterday. The problem has been gradually worsening. The pain is worse on the right side. Associated symptoms include congestion, coughing, headaches and swollen glands.  Pertinent n Surgical History:     Past Surgical History:   Procedure Laterality Date   • Electrocardiogram, complete  10-    SCANNED TO MEDIA TAB: 10-   • Eye surgery     • Laparoscopy,diagnostic  08/2000    Ectopic pregnancy   • Other surgical history connections:        Talks on phone: Not on file        Gets together: Not on file        Attends [employer] service: Not on file        Active member of club or organization: Not on file        Attends meetings of clubs or organizations: Not on file Weight: 154 lb 4.8 oz (70 kg)   Height: 5' 3\" (1.6 m)     Body mass index is 27.33 kg/m². Physical Exam:   Physical Exam   Vitals reviewed. Constitutional: She is oriented to person, place, and time. She appears well-developed and well-nourished.  Heidi Up plan of care with pt and pt is in agreement. All questions answered. Pt to call with questions or concerns.

## 2020-02-12 NOTE — ASSESSMENT & PLAN NOTE
Start Augmentin 875 mg PO BID for 10 days. Supportive care includes:    • encourage fluid intake   • Advise patient to take Tylenol/Ibuprofen as needed for pain.   • warm salt water gargles   • throat lozenges, hard candy, or frozen desserts   • soft foods

## 2020-02-12 NOTE — TELEPHONE ENCOUNTER
Action Requested: Summary for Provider     []  Critical Lab, Recommendations Needed  [] Need Additional Advice  []   FYI    []   Need Orders  [] Need Medications Sent to Pharmacy  []  Other     SUMMARY:   Appointment made for today 2/12/20    The patient s

## 2020-10-19 DIAGNOSIS — E89.0 POSTOPERATIVE HYPOTHYROIDISM: Primary | ICD-10-CM

## 2020-10-19 RX ORDER — LEVOTHYROXINE SODIUM 112 UG/1
112 TABLET ORAL
Qty: 30 TABLET | Refills: 3 | Status: SHIPPED | OUTPATIENT
Start: 2020-10-19 | End: 2021-01-25

## 2020-10-19 NOTE — TELEPHONE ENCOUNTER
Patient requesting refills for Levothyroxine. Please call. Thank you.     Current Outpatient Medications   Medication Sig Dispense Refill   • LEVOTHYROXINE SODIUM 112 MCG Oral Tab TAKE 1 TABLET(112 MCG) BY MOUTH BEFORE BREAKFAST 30 tablet 5

## 2020-10-20 ENCOUNTER — E-VISIT (OUTPATIENT)
Dept: TELEHEALTH | Age: 40
End: 2020-10-20
Payer: COMMERCIAL

## 2020-10-20 DIAGNOSIS — Z20.822 SUSPECTED COVID-19 VIRUS INFECTION: Primary | ICD-10-CM

## 2020-10-20 PROCEDURE — 99421 OL DIG E/M SVC 5-10 MIN: CPT | Performed by: NURSE PRACTITIONER

## 2020-10-20 NOTE — PATIENT INSTRUCTIONS
Coronavirus Disease 2019 (COVID-19)     [facility] is committed to the safety and well-being of our patients, members, employees, and communities.  As concerns arise about the new strain of coronavirus that causes COVID-19, [facility] 4. If you have a medical appointment, call the healthcare provider ahead of time and tell them that you have or may have COVID-19.  5. For medical emergencies, call 911 and notify the dispatch personnel that you have or may have COVID-19.   6. Cover your c · At least 10 days have passed since symptoms first appeared OR if asymptomatic patient or date of symptom onset is unclear then use 10 days post COVID test date.    · At least 20 days have passed for severe illness (requiring hospitalization) OR if you are *Some people will be required to have a repeat COVID-19 test in order to be eligible to donate. If you’re instructed by Bing Kearney that a repeat test is required, please contact the [facility] COVID-19 Nurse Triage Line at [phone].     Additional Inf

## 2020-10-20 NOTE — PROGRESS NOTES
Refer to patient Questionnaire and responses. See MyChart messages. 10 minutes spent in direct communication with patient via [facility].

## 2021-01-25 ENCOUNTER — OFFICE VISIT (OUTPATIENT)
Dept: ENDOCRINOLOGY CLINIC | Facility: CLINIC | Age: 41
End: 2021-01-25
Payer: COMMERCIAL

## 2021-01-25 ENCOUNTER — LAB ENCOUNTER (OUTPATIENT)
Dept: LAB | Facility: HOSPITAL | Age: 41
End: 2021-01-25
Attending: INTERNAL MEDICINE
Payer: COMMERCIAL

## 2021-01-25 VITALS
WEIGHT: 151 LBS | DIASTOLIC BLOOD PRESSURE: 73 MMHG | BODY MASS INDEX: 27 KG/M2 | SYSTOLIC BLOOD PRESSURE: 115 MMHG | HEART RATE: 84 BPM

## 2021-01-25 DIAGNOSIS — E55.9 VITAMIN D DEFICIENCY: ICD-10-CM

## 2021-01-25 DIAGNOSIS — E89.0 POSTOPERATIVE HYPOTHYROIDISM: Primary | ICD-10-CM

## 2021-01-25 DIAGNOSIS — N91.1 AMENORRHEA, SECONDARY: ICD-10-CM

## 2021-01-25 DIAGNOSIS — E89.0 POSTOPERATIVE HYPOTHYROIDISM: ICD-10-CM

## 2021-01-25 LAB
ESTRADIOL SERPL-MCNC: 57.5 PG/ML
FSH SERPL-ACNC: 74.9 MIU/ML
T4 FREE SERPL-MCNC: 1.3 NG/DL (ref 0.8–1.7)
TSI SER-ACNC: 0.32 MIU/ML (ref 0.36–3.74)

## 2021-01-25 PROCEDURE — 99213 OFFICE O/P EST LOW 20 MIN: CPT | Performed by: INTERNAL MEDICINE

## 2021-01-25 PROCEDURE — 36415 COLL VENOUS BLD VENIPUNCTURE: CPT

## 2021-01-25 PROCEDURE — 3078F DIAST BP <80 MM HG: CPT | Performed by: INTERNAL MEDICINE

## 2021-01-25 PROCEDURE — 83001 ASSAY OF GONADOTROPIN (FSH): CPT

## 2021-01-25 PROCEDURE — 82306 VITAMIN D 25 HYDROXY: CPT

## 2021-01-25 PROCEDURE — 84439 ASSAY OF FREE THYROXINE: CPT

## 2021-01-25 PROCEDURE — 3074F SYST BP LT 130 MM HG: CPT | Performed by: INTERNAL MEDICINE

## 2021-01-25 PROCEDURE — 82670 ASSAY OF TOTAL ESTRADIOL: CPT

## 2021-01-25 PROCEDURE — 84443 ASSAY THYROID STIM HORMONE: CPT

## 2021-01-25 RX ORDER — LEVOTHYROXINE SODIUM 112 UG/1
112 TABLET ORAL
Qty: 90 TABLET | Refills: 3 | Status: SHIPPED | OUTPATIENT
Start: 2021-01-25

## 2021-01-27 LAB — 25(OH)D3 SERPL-MCNC: 21 NG/ML (ref 30–100)

## 2022-02-12 RX ORDER — LEVOTHYROXINE SODIUM 112 UG/1
TABLET ORAL
Qty: 90 TABLET | Refills: 0 | Status: SHIPPED | OUTPATIENT
Start: 2022-02-12

## 2022-03-09 ENCOUNTER — E-VISIT (OUTPATIENT)
Dept: TELEHEALTH | Age: 42
End: 2022-03-09

## 2022-03-09 ENCOUNTER — LAB ENCOUNTER (OUTPATIENT)
Dept: LAB | Age: 42
End: 2022-03-09
Attending: PHYSICIAN ASSISTANT
Payer: COMMERCIAL

## 2022-03-09 DIAGNOSIS — J06.9 VIRAL UPPER RESPIRATORY TRACT INFECTION: ICD-10-CM

## 2022-03-09 DIAGNOSIS — J06.9 VIRAL UPPER RESPIRATORY TRACT INFECTION: Primary | ICD-10-CM

## 2022-03-09 PROCEDURE — 99421 OL DIG E/M SVC 5-10 MIN: CPT | Performed by: PHYSICIAN ASSISTANT

## 2022-03-10 LAB — SARS-COV-2 RNA RESP QL NAA+PROBE: NOT DETECTED

## 2022-04-11 ENCOUNTER — LAB ENCOUNTER (OUTPATIENT)
Dept: LAB | Facility: HOSPITAL | Age: 42
End: 2022-04-11
Attending: INTERNAL MEDICINE
Payer: COMMERCIAL

## 2022-04-11 ENCOUNTER — OFFICE VISIT (OUTPATIENT)
Dept: ENDOCRINOLOGY CLINIC | Facility: CLINIC | Age: 42
End: 2022-04-11
Payer: COMMERCIAL

## 2022-04-11 VITALS
BODY MASS INDEX: 27 KG/M2 | WEIGHT: 153.19 LBS | HEART RATE: 69 BPM | DIASTOLIC BLOOD PRESSURE: 59 MMHG | SYSTOLIC BLOOD PRESSURE: 103 MMHG

## 2022-04-11 DIAGNOSIS — E89.0 POSTOPERATIVE HYPOTHYROIDISM: Primary | ICD-10-CM

## 2022-04-11 DIAGNOSIS — E89.0 POSTOPERATIVE HYPOTHYROIDISM: ICD-10-CM

## 2022-04-11 DIAGNOSIS — Z00.00 PREVENTATIVE HEALTH CARE: ICD-10-CM

## 2022-04-11 LAB
ALBUMIN SERPL-MCNC: 3.7 G/DL (ref 3.4–5)
ALBUMIN/GLOB SERPL: 1 {RATIO} (ref 1–2)
ALP LIVER SERPL-CCNC: 85 U/L
ALT SERPL-CCNC: 21 U/L
ANION GAP SERPL CALC-SCNC: 4 MMOL/L (ref 0–18)
AST SERPL-CCNC: 18 U/L (ref 15–37)
BILIRUB SERPL-MCNC: 0.3 MG/DL (ref 0.1–2)
BUN BLD-MCNC: 11 MG/DL (ref 7–18)
BUN/CREAT SERPL: 12.1 (ref 10–20)
CALCIUM BLD-MCNC: 8.8 MG/DL (ref 8.5–10.1)
CHLORIDE SERPL-SCNC: 104 MMOL/L (ref 98–112)
CHOLEST SERPL-MCNC: 194 MG/DL (ref ?–200)
CO2 SERPL-SCNC: 31 MMOL/L (ref 21–32)
CREAT BLD-MCNC: 0.91 MG/DL
DEPRECATED RDW RBC AUTO: 41.6 FL (ref 35.1–46.3)
ERYTHROCYTE [DISTWIDTH] IN BLOOD BY AUTOMATED COUNT: 12.9 % (ref 11–15)
FASTING PATIENT LIPID ANSWER: YES
FASTING STATUS PATIENT QL REPORTED: YES
GLOBULIN PLAS-MCNC: 3.6 G/DL (ref 2.8–4.4)
GLUCOSE BLD-MCNC: 74 MG/DL (ref 70–99)
HCT VFR BLD AUTO: 37.6 %
HDLC SERPL-MCNC: 54 MG/DL (ref 40–59)
HGB BLD-MCNC: 12.4 G/DL
LDLC SERPL CALC-MCNC: 117 MG/DL (ref ?–100)
MCH RBC QN AUTO: 29.2 PG (ref 26–34)
MCHC RBC AUTO-ENTMCNC: 33 G/DL (ref 31–37)
MCV RBC AUTO: 88.7 FL
NONHDLC SERPL-MCNC: 140 MG/DL (ref ?–130)
OSMOLALITY SERPL CALC.SUM OF ELEC: 286 MOSM/KG (ref 275–295)
PLATELET # BLD AUTO: 301 10(3)UL (ref 150–450)
POTASSIUM SERPL-SCNC: 4.2 MMOL/L (ref 3.5–5.1)
PROT SERPL-MCNC: 7.3 G/DL (ref 6.4–8.2)
RBC # BLD AUTO: 4.24 X10(6)UL
SODIUM SERPL-SCNC: 139 MMOL/L (ref 136–145)
T4 FREE SERPL-MCNC: 1.1 NG/DL (ref 0.8–1.7)
TRIGL SERPL-MCNC: 129 MG/DL (ref 30–149)
TSI SER-ACNC: 0.51 MIU/ML (ref 0.36–3.74)
VLDLC SERPL CALC-MCNC: 22 MG/DL (ref 0–30)
WBC # BLD AUTO: 10 X10(3) UL (ref 4–11)

## 2022-04-11 PROCEDURE — 80053 COMPREHEN METABOLIC PANEL: CPT

## 2022-04-11 PROCEDURE — 3078F DIAST BP <80 MM HG: CPT | Performed by: INTERNAL MEDICINE

## 2022-04-11 PROCEDURE — 99214 OFFICE O/P EST MOD 30 MIN: CPT | Performed by: INTERNAL MEDICINE

## 2022-04-11 PROCEDURE — 80061 LIPID PANEL: CPT

## 2022-04-11 PROCEDURE — 84439 ASSAY OF FREE THYROXINE: CPT

## 2022-04-11 PROCEDURE — 3074F SYST BP LT 130 MM HG: CPT | Performed by: INTERNAL MEDICINE

## 2022-04-11 PROCEDURE — 84443 ASSAY THYROID STIM HORMONE: CPT

## 2022-04-11 PROCEDURE — 36415 COLL VENOUS BLD VENIPUNCTURE: CPT

## 2022-04-11 PROCEDURE — 85027 COMPLETE CBC AUTOMATED: CPT

## 2022-04-11 RX ORDER — LEVOTHYROXINE SODIUM 112 UG/1
112 TABLET ORAL
Qty: 90 TABLET | Refills: 3 | Status: SHIPPED | OUTPATIENT
Start: 2022-04-11

## 2022-09-06 ENCOUNTER — LAB ENCOUNTER (OUTPATIENT)
Dept: LAB | Age: 42
End: 2022-09-06
Attending: NURSE PRACTITIONER
Payer: COMMERCIAL

## 2022-09-06 ENCOUNTER — E-VISIT (OUTPATIENT)
Dept: TELEHEALTH | Age: 42
End: 2022-09-06

## 2022-09-06 DIAGNOSIS — Z20.822 ENCOUNTER FOR LABORATORY TESTING FOR COVID-19 VIRUS: ICD-10-CM

## 2022-09-06 DIAGNOSIS — Z20.822 ENCOUNTER FOR LABORATORY TESTING FOR COVID-19 VIRUS: Primary | ICD-10-CM

## 2022-09-06 DIAGNOSIS — U07.1 POSITIVE SELF-ADMINISTERED ANTIGEN TEST FOR COVID-19: ICD-10-CM

## 2022-09-06 LAB — SARS-COV-2 RNA RESP QL NAA+PROBE: DETECTED

## 2022-09-06 PROCEDURE — 99422 OL DIG E/M SVC 11-20 MIN: CPT | Performed by: NURSE PRACTITIONER

## 2023-07-12 DIAGNOSIS — E89.0 POSTOPERATIVE HYPOTHYROIDISM: ICD-10-CM

## 2023-07-12 RX ORDER — LEVOTHYROXINE SODIUM 112 UG/1
112 TABLET ORAL
Qty: 90 TABLET | Refills: 1 | Status: SHIPPED | OUTPATIENT
Start: 2023-07-12

## 2023-07-12 NOTE — TELEPHONE ENCOUNTER
Patient is calling to request a refill on the following medication    levothyroxine 112 MCG Oral Tab 90 tablet 3 4/11/2022     Sig - Route:  Take 1 tablet (112 mcg total) by mouth before breakfast. - Oral    Sent to pharmacy as: Levothyroxine Sodium 112 MCG Oral Tablet    E-Prescribing Status: Receipt confirmed by pharmacy (4/11/2022  2:54 PM CDT)

## 2023-11-15 ENCOUNTER — PATIENT MESSAGE (OUTPATIENT)
Dept: ENDOCRINOLOGY CLINIC | Facility: CLINIC | Age: 43
End: 2023-11-15

## 2023-11-16 NOTE — TELEPHONE ENCOUNTER
From: Bob Angelo  Sent: 11/15/2023 10:16 PM CST  To: Leticia Wright Clinical Staff  Subject: Appointment    Hello,    Yes, I'm able to come in earlier, at 9:45. Thank you for reaching out. I'm sorry I missed your previous message.  - Kelin Goode

## 2024-01-21 DIAGNOSIS — E89.0 POSTOPERATIVE HYPOTHYROIDISM: ICD-10-CM

## 2024-01-21 NOTE — TELEPHONE ENCOUNTER
LOV: 04/11/0022     Next office visit: no apt     Last filled: 07/12/2023       Order pended and routed to provider

## 2024-01-22 RX ORDER — LEVOTHYROXINE SODIUM 112 UG/1
112 TABLET ORAL
Qty: 90 TABLET | Refills: 1 | Status: SHIPPED | OUTPATIENT
Start: 2024-01-22

## 2024-09-04 ENCOUNTER — OFFICE VISIT (OUTPATIENT)
Dept: ENDOCRINOLOGY CLINIC | Facility: CLINIC | Age: 44
End: 2024-09-04

## 2024-09-04 ENCOUNTER — LAB ENCOUNTER (OUTPATIENT)
Dept: LAB | Age: 44
End: 2024-09-04
Attending: INTERNAL MEDICINE
Payer: MEDICAID

## 2024-09-04 VITALS
SYSTOLIC BLOOD PRESSURE: 102 MMHG | BODY MASS INDEX: 26 KG/M2 | HEART RATE: 58 BPM | WEIGHT: 147 LBS | DIASTOLIC BLOOD PRESSURE: 61 MMHG

## 2024-09-04 DIAGNOSIS — E89.0 POSTOPERATIVE HYPOTHYROIDISM: ICD-10-CM

## 2024-09-04 LAB
T4 FREE SERPL-MCNC: 0.5 NG/DL (ref 0.8–1.7)
TSI SER-ACNC: 25.5 MIU/ML (ref 0.55–4.78)

## 2024-09-04 PROCEDURE — 36415 COLL VENOUS BLD VENIPUNCTURE: CPT

## 2024-09-04 PROCEDURE — 99213 OFFICE O/P EST LOW 20 MIN: CPT | Performed by: INTERNAL MEDICINE

## 2024-09-04 PROCEDURE — 84439 ASSAY OF FREE THYROXINE: CPT

## 2024-09-04 PROCEDURE — 84443 ASSAY THYROID STIM HORMONE: CPT

## 2024-09-04 RX ORDER — LEVOTHYROXINE SODIUM 112 UG/1
112 TABLET ORAL
Qty: 90 TABLET | Refills: 3 | Status: SHIPPED | OUTPATIENT
Start: 2024-09-04

## 2024-09-04 NOTE — PROGRESS NOTES
Name: Cheryle Stern  Date: 9/4/2024    Referring Physician: No ref. provider found    No chief complaint on file.      HISTORY OF PRESENT ILLNESS   Cheryle Stern is a 44 year old female who presents for No chief complaint on file.    45 y/o F presents for follow up evaluation of hyperthyroidism. She initially presented to gynecologist with lack of menses for 6 months therefore checked TSH which was suppressed. She also notes daily HA, emotional lability. Since last visit her methimazole dose was increased to 40mg daily however still symptomatic and labs not improved. Therefore methimazole was increased to 60mg daily and started one week course of prednisone with improved symptoms. Palpitations are improved and tremor improved, stable weight.   She has now undergone total thyroidectomy on 8/6/2014 with resulting hypothyroidism.  She is currently maintained on LT4 112mcg PO daily.  She then developed recurrent Graves Disease which required I-131 ablation in 2016.    9/2024   She is feeling well since last visit.  She is maintained on Levothyroxine 112mcg PO daily, taking medication in AM and waiting 30-60 min before eating. She is not having regular menstrual cycles.  No compression symptoms.     REVIEW OF SYSTEMS  Eyes: no change in vision  Neurologic: no headache, generalized or focal weakness or numbness.  Head: normal  ENT: normal  Lungs: no shortness of breath, wheezing or NICOLAS  Cardiovascular:  no chest pain or palpitations  Gastrointestinal:  no abdominal pain, bowel movement problems  Musculoskeletal: no muscle pain or arthralgia  /Gyne: no frequency or discomfort while urinating  Psychiatric:  no acute distress, anxiety  or depression  Skin: normal moisturized skin    Medications:   Abilify, Sertraline,      Allergies:   No Known Allergies    Social History:   Social History     Socioeconomic History    Marital status: Single   Tobacco Use    Smoking status: Former     Current packs/day: 0.00     Average  packs/day: 0.3 packs/day for 5.0 years (1.3 ttl pk-yrs)     Types: Cigarettes     Start date: 2000     Quit date: 2005     Years since quittin.6    Smokeless tobacco: Former   Substance and Sexual Activity    Alcohol use: No     Comment: None.     Drug use: No    Sexual activity: Yes     Partners: Male   Other Topics Concern    Caffeine Concern Yes     Comment: Tea, Coffee, 3 cups; per NG       Medical History:   Past Medical History:    Amblyopia    OS; Patched as a kid     Anemia    per NG    Anisometropic amblyopia of right eye    Depression    H/O thyroidectomy    History of ectopic pregnancy    Surgical procedure; per NG    Hyperthyroidism    Lipid screening    per NG    MGD (meibomian gland disease)       Surgical history:   Past Surgical History:   Procedure Laterality Date    Electrocardiogram, complete  10-    SCANNED TO MEDIA TAB: 10-    Eye surgery      Laparoscopy,diagnostic  2000    Ectopic pregnancy    Other surgical history      \"Ectopic pregnancy: Surgical procedure\"; per NG       PHYSICAL EXAMINATION:  /61   Pulse 58   Wt 147 lb (66.7 kg)   BMI 26.04 kg/m²     General Appearance:  Alert, in no acute distress, well developed  Eyes: normal conjunctivae, sclera.  Ears/Nose/Mouth/Throat/Neck:  normal hearing, normal speech and absent thyroid gland, no LNs  Neurologic: sensory grossly intact and motor grossly intact  Musculoskeletal:  normal muscle strength and tone  Skin:  normal moisture and skin texture  Hair & Nails:  normal scalp hair     Neuro:  sensory grossly intact and motor grossly intact  Psychiatric:  oriented to time, self, and place  Nutritional:  no abnormal weight gain or loss    ASSESSMENT/PLAN:    1. Hyperthyroidism  -Diagnosed with Graves Disease  -Now s/p total thyroidectomy with postoperative hypothyroidism  -Continue Levothyroxine 112mcg PO daily  -Discussed importance of compliance at length  -Recheck TSH, FT4  -Discussed importance of  taking medication in AM on empty stomach and wait 30-60 min before eating      RTC 1 year     9/4/2024  Cally Babin MD

## 2024-09-05 ENCOUNTER — TELEPHONE (OUTPATIENT)
Dept: ENDOCRINOLOGY CLINIC | Facility: CLINIC | Age: 44
End: 2024-09-05

## 2024-09-05 DIAGNOSIS — E89.0 POSTOPERATIVE HYPOTHYROIDISM: Primary | ICD-10-CM

## 2024-09-05 NOTE — TELEPHONE ENCOUNTER
Please call patient - her thyroid levels are largely abnormal.  However I think she had missed some doses prior to labs, correct?  Lets have her be very consistent with medication every day for 2 months then recheck labs.  Check TSH, FT4 in 2 months. Thanks.

## 2024-09-05 NOTE — TELEPHONE ENCOUNTER
LM and MC sent with message below - patient advised to contact clinic with questions/issues and repeat labs early November

## 2025-07-10 ENCOUNTER — APPOINTMENT (OUTPATIENT)
Dept: GENERAL RADIOLOGY | Age: 45
End: 2025-07-10
Attending: EMERGENCY MEDICINE
Payer: MEDICAID

## 2025-07-10 ENCOUNTER — HOSPITAL ENCOUNTER (OUTPATIENT)
Age: 45
Discharge: HOME | End: 2025-07-10
Attending: EMERGENCY MEDICINE
Payer: MEDICAID

## 2025-07-10 VITALS
TEMPERATURE: 98 F | OXYGEN SATURATION: 100 % | DIASTOLIC BLOOD PRESSURE: 72 MMHG | SYSTOLIC BLOOD PRESSURE: 123 MMHG | RESPIRATION RATE: 19 BRPM | HEART RATE: 74 BPM

## 2025-07-10 DIAGNOSIS — S62.326A DISPLACED FRACTURE OF SHAFT OF FIFTH METACARPAL BONE, RIGHT HAND, INITIAL ENCOUNTER FOR CLOSED FRACTURE: Primary | ICD-10-CM

## 2025-07-10 PROCEDURE — 73130 X-RAY EXAM OF HAND: CPT | Performed by: RADIOLOGY

## 2025-07-10 PROCEDURE — 99204 OFFICE O/P NEW MOD 45 MIN: CPT

## 2025-07-10 PROCEDURE — 29125 APPL SHORT ARM SPLINT STATIC: CPT

## 2025-07-10 PROCEDURE — 73110 X-RAY EXAM OF WRIST: CPT | Performed by: RADIOLOGY

## 2025-07-10 PROCEDURE — 99214 OFFICE O/P EST MOD 30 MIN: CPT

## 2025-07-10 NOTE — ED PROVIDER NOTES
Patient Seen in: [facility]        History  Chief Complaint   Patient presents with    Wrist Injury     Stated Complaint: hand injury/wrist    Subjective:   HPI    The patient is a 45-year-old female with no significant past medical history who presents now with right wrist/hand pain.  Patient states she tripped over her dogs while walking on the stairs yesterday evening and fell.  Patient denies any head injury.  The patient denies any chest, abdominal, lower extremity pain.  Patient presents now with persistent pain and swelling to the right hand and wrist.      Objective:     No pertinent past medical history.            No pertinent past surgical history.              No pertinent social history.            Review of Systems    Positive for stated complaint: hand injury/wrist  Other systems are as noted in HPI.  Constitutional and vital signs reviewed.      All other systems reviewed and negative except as noted above.                  Physical Exam    ED Triage Vitals [07/10/25 1736]   /72   Pulse 74   Resp 19   Temp 98.4 °F (36.9 °C)   Temp src Oral   SpO2 100 %   O2 Device None (Room air)       Current Vitals:   Vital Signs  BP: 123/72  Pulse: 74  Resp: 19  Temp: 98.4 °F (36.9 °C)  Temp src: Oral    Oxygen Therapy  SpO2: 100 %  O2 Device: None (Room air)            Physical Exam    Constitutional: Well-developed well-nourished in no acute distress  Head: Normocephalic, no swelling or tenderness  Eyes: Nonicteric sclera, no conjunctival injection  Vascular: Palpable right radial pulse with good capillary refill to all fingers  Neurologic: Patient is awake, alert and oriented ×3.  The patient's motor strength is 5 out of 5 and symmetric in the upper and lower extremities bilaterally  Extremities: There is mild swelling and tenderness to the lateral aspect of the right hand, most pronounced over the right fifth metacarpal.  There is mild swelling and tenderness overlying the lateral aspect of  the right wrist, most pronounced over the ulnar styloid.  Flexion and extension of the fingers and wrist are intact though somewhat painful.  Skin: There is mild ecchymosis to the dorsum of the right hand and wrist laterally.          ED Course  Labs Reviewed - No data to display       Patient's x-rays were independently reviewed by myself.  There is an acute mildly displaced oblique fracture of the fifth metacarpal shaft.  There is no acute fracture noted at the right wrist        Patient's x-rays were discussed with the patient.  Will place in ulnar gutter splint and provide follow-up with hand surgery    Procedure note: Splint check  After placement of right ulnar gutter splint, the proper joint is immobilized, right hand is neurovascularly intact          MDM     Sprain versus fracture of the right hand/wrist        Medical Decision Making      Disposition and Plan     Clinical Impression:  1. Displaced fracture of shaft of fifth metacarpal bone, right hand, initial encounter for closed fracture         Disposition:  Discharge  7/10/2025  6:19 pm    Follow-up:  Heriberto Angel MD  [address] [place] [id number]  [phone]      Call for an appointment          Medications Prescribed:  Current Discharge Medication List                Supplementary Documentation:

## 2025-07-14 ENCOUNTER — OFFICE VISIT (OUTPATIENT)
Dept: SURGERY | Facility: CLINIC | Age: 45
End: 2025-07-14

## 2025-07-14 DIAGNOSIS — S62.356A CLOSED NONDISPLACED FRACTURE OF SHAFT OF FIFTH METACARPAL BONE OF RIGHT HAND, INITIAL ENCOUNTER: Primary | ICD-10-CM

## 2025-07-14 PROCEDURE — 99204 OFFICE O/P NEW MOD 45 MIN: CPT | Performed by: PLASTIC SURGERY

## 2025-07-14 PROCEDURE — 29125 APPL SHORT ARM SPLINT STATIC: CPT | Performed by: OCCUPATIONAL THERAPIST

## 2025-07-14 RX ORDER — ARIPIPRAZOLE 5 MG/1
5 TABLET ORAL DAILY
COMMUNITY

## 2025-07-14 RX ORDER — VENLAFAXINE HYDROCHLORIDE 150 MG/1
150 CAPSULE, EXTENDED RELEASE ORAL EVERY MORNING
COMMUNITY

## 2025-07-14 RX ORDER — HYDROXYZINE HYDROCHLORIDE 25 MG/1
25 TABLET, FILM COATED ORAL 2 TIMES DAILY
COMMUNITY

## 2025-07-14 NOTE — H&P
Injury 1: RH injury  - Date: 07/10/25  - Days Since: 4    Cheryle Stern is a 45 year old female that presents with   RH injury.    REFERRED BY:  Clarence Bloom    Pacemaker: No  Latex Allergy: no  Coumadin: No  Plavix: No  Other anticoagulants: No  Diet medication: No  Cardiac stents: No    HAND DOMINANCE:  Right  Profession: /sales    RECONSTRUCTIVE HISTORY    SUN EXPOSURE   Current no   Past no   Sunburns yes   Tanning salons current no   Tanning salons past no    SKIN CANCER    Personal history of skin cancer: none      HPI:       Injury 1: RSF MC shaft, oblique, nondisplaced  - Date: 07/10/25  - Days Since: 4      45-year-old female right-hand-dominant with an RSF fracture    Fell striking her hand on the counter    Immediate care, x-rayed and splinted    Intermittent pain                Review of Systems:   Constitutional: No change in appetite, chill/rigors, or fatigue  GI: No jaundice  Endocrine: No generalized weakness  Neurological: No aphasia, loss of consciousness, or seizures    Musculoskeletal:      Date of injury 7/10/25     Location R hand/wrist     Mechanism Fell at home, caught her fall possibly on counter     Treatment Seen in immediate care 7/10,  xray done, splinted, wearing at all times     Pain  yes intermittent throbbing, RSF/ulnar/R wrist      Numbness Pt unsure    RH ulnar gutter splint CDI, removed      PMH:     MEDICAL  Past Medical History[1]     SURGICAL  Past Surgical History[2]     ALLERIGIES  Allergies[3]     MEDICATIONS  Current Medications[4]     SOCIAL HISTORY  Short Social Hx on File[5]     FAMILY HISTORY  Family History[6]       PHYSICAL EXAM:     CONSTITUTIONAL: Overall appearance - Normal  HEENT: Normocephalic  EYES: Conjunctiva - Right: Normal, Left: Normal; EOMI  EARS: Inspection - Right: Normal, Left: Normal  NECK/THYROID: Inspection - Normal, Palpation - Normal, Thyroid gland - Normal, No adenopathy  RESPIRATORY: Inspection - Normal, Effort -  Normal  CARDIOVASCULAR: Regular rhythm, No murmurs  ABDOMEN: Inspection - Normal, No abdominal tenderness  NEURO: Memory intact  PSYCH: Oriented to person, place, time, and situation, Appropriate mood and affect      Hand Physical Exam:     RSF  No lag  FDS, FDP, central slip, terminal slip intact  PIP/DIP   RCL/UCL intact    No rotation on flexion    X-ray independently interpreted: Nondisplaced oblique fracture of shaft    ASSESSMENT/PLAN:     Fracture: RSF metacarpal shaft nondisplaced, nonrotated    We discussed the fracture/dislocation and the treatment options.  Questions were answered and the patient wishes to proceed with treatment.     SPLINT - This fracture can be treated with a splint.  We discussed splint care and instructions, as well as restrictions.  If there is displacement of the fracture, surgery may be required.    Ulnar gutter splint    We discussed restrictions.    Even with satisfactory healing, the hand/digit may not regain normal ROM or normal function.      2 weeks start active range  3 weeks passive range and strengthening  4 weeks discontinue splint  5 weeks return to work regular duty, 8/18 7/14/2025  Heriberto Vargas MD        +++++++++++++++++++++++++++++++++++++++++      MEDICAL DECISION MAKING    PROBLEMS      MODERATE    (number / complexity)          Acute complicated injury    DATA         MODERATE    (amount / complexity)          X-rays independently reviewed    MANAGEMENT RISK  LOW    (complications/ morbidity)       Splint/OT                  MDM LEVEL    MODERATE               [1]   Past Medical History:   Amblyopia    OS; Patched as a kid     Anemia    per NG    Anisometropic amblyopia of right eye    Depression    H/O thyroidectomy    History of ectopic pregnancy    Surgical procedure; per NG    Hyperthyroidism    Lipid screening    per NG    MGD (meibomian gland disease)   [2]   Past Surgical History:  Procedure Laterality Date    Electrocardiogram, complete   10-    SCANNED TO MEDIA TAB: 10-    Eye surgery      Laparoscopy,diagnostic  2000    Ectopic pregnancy    Other surgical history      \"Ectopic pregnancy: Surgical procedure\"; per NG   [3]   Allergies  Allergen Reactions    Risperidone OTHER (SEE COMMENTS)     \"Really bad jaw movements\"   [4]   Current Outpatient Medications   Medication Sig Dispense Refill    venlafaxine  MG Oral Capsule SR 24 Hr Take 1 capsule (150 mg total) by mouth every morning.      ARIPiprazole 5 MG Oral Tab Take 1 tablet (5 mg total) by mouth daily.      hydrOXYzine 25 MG Oral Tab Take 1 tablet (25 mg total) by mouth 2 (two) times daily.      levothyroxine 112 MCG Oral Tab Take 1 tablet (112 mcg total) by mouth before breakfast. 90 tablet 3   [5]   Social History  Socioeconomic History    Marital status: Single   Tobacco Use    Smoking status: Former     Current packs/day: 0.00     Average packs/day: 0.3 packs/day for 5.0 years (1.3 ttl pk-yrs)     Types: Cigarettes     Start date: 2000     Quit date: 2005     Years since quittin.5    Smokeless tobacco: Former   Substance and Sexual Activity    Alcohol use: No     Comment: None.     Drug use: No    Sexual activity: Yes     Partners: Male   Other Topics Concern    Caffeine Concern Yes     Comment: Tea, Coffee, 3 cups; per NG     Social Drivers of Health     Food Insecurity: High Risk (2024)    Received from [facility]    Food Insecurity     Have there been times that your food ran out, and you didn't have money to get more?: Yes   Transportation Needs: High Risk (2024)    Received from [facility]    Transportation Needs     Do you have trouble getting transportation to medical appointments?: Yes     How do you normally get to and from your appointments?: Family/Friend   Housing Stability: High Risk (2024)    Received from [facility]    Housing Stability     Are you worried  that your electric, gas, oil, or water might be shut off?: Yes     Are you concerned about having a safe and reliable place to live?: Yes   [6]   Family History  Problem Relation Age of Onset    Lipids Father     Hypertension Father     Heart Disease Father         CAD; per NG    Heart Disorder Paternal Grandfather     Cancer Paternal Grandfather     Pulmonary Disease Mother         COPD; per NG    Blood Disorder Mother         Bone Marrow Problem; per NG    Cataracts Mother     Kidney Disease Sister     Renal Disease Sister         Kidney stones; per NG    Anemia Brother         per NG    Cancer Maternal Grandmother     Glaucoma Neg     Diabetes Neg     Macular degeneration Neg

## 2025-07-15 NOTE — PROGRESS NOTES
Subjective: I hurt my RSF.      Objective:     Current level of performance:  ADL: Independent  Work: 2 # lifting restriction with the right hand.  Leisure: Art    Measurements/Tests:  ROM:         N/A         Treatment Provided this day: Fabricated a right ulnar gutter splint per order.    Treatment Time: 30 minutes      Summary/Analysis of Treatment session: Tolerated the fabrication of a right ulnar gutter splint well.      Plan: To be compliant with the wear and care of right ulnar gutter splint x 4 weeks.      Follow up in:  x 2 weeks.          Donovan GONZALEZ/L

## 2025-07-29 ENCOUNTER — OFFICE VISIT (OUTPATIENT)
Dept: SURGERY | Facility: CLINIC | Age: 45
End: 2025-07-29

## 2025-07-29 DIAGNOSIS — M25.641 JOINT STIFFNESS OF HAND, RIGHT: ICD-10-CM

## 2025-07-29 DIAGNOSIS — M62.81 DISTAL MUSCLE WEAKNESS: Primary | ICD-10-CM

## 2025-07-29 PROCEDURE — 97165 OT EVAL LOW COMPLEX 30 MIN: CPT | Performed by: OCCUPATIONAL THERAPIST

## 2025-07-29 PROCEDURE — 97110 THERAPEUTIC EXERCISES: CPT | Performed by: OCCUPATIONAL THERAPIST

## 2025-08-12 ENCOUNTER — OFFICE VISIT (OUTPATIENT)
Dept: SURGERY | Facility: CLINIC | Age: 45
End: 2025-08-12

## 2025-08-12 DIAGNOSIS — M62.81 DISTAL MUSCLE WEAKNESS: Primary | ICD-10-CM

## 2025-08-12 DIAGNOSIS — M25.641 JOINT STIFFNESS OF HAND, RIGHT: ICD-10-CM

## 2025-08-12 PROCEDURE — 97110 THERAPEUTIC EXERCISES: CPT | Performed by: OCCUPATIONAL THERAPIST

## (undated) DIAGNOSIS — E89.0 POSTOPERATIVE HYPOTHYROIDISM: ICD-10-CM

## (undated) NOTE — MR AVS SNAPSHOT
[facility]  [address] [place] [address], [address] [place] [address] Lynda               Thank you for choosing us for your health care visit with Jessie Owens MD.  We are glad to serve you and happy to provide you with this summary of yo Instructions:  **THIS IS NOT A REFERRAL**  Unless your physician has indicated otherwise, please contact one of the following locations to schedule your imaging test.    Contact Site for hours.      ** Saturday Hours Available     [address] Note to Managed Care Patients: This is the physician order form only and not an authorization for services.     The [facility] refers patients to have physical therapy done at [facility] however, your insurance company may require you acetaminophen 325 MG Tabs   Take 2 tablets (650 mg total) by mouth every 4 (four) hours as needed.    Commonly known as:  TYLENOL           Levothyroxine Sodium 112 MCG Tabs   Take 1 tablet (112 mcg total) by mouth before breakfast.   Commonly known as:  S Choose whole grain products Foods high in sodium   Water is best for hydration Fast food.    Eat at home when possible     Tips for increasing your physical activity – Adults who are physically active are less likely to develop some chronic diseases than ad

## (undated) NOTE — MR AVS SNAPSHOT
[facility]  [address] [place] [address], [address] [place] [address] Lynda               Thank you for choosing us for your health care visit with Jessie Owens MD.  We are glad to serve you and happy to provide you with this summary of yo authorization, such as [place], please feel free to schedule your appointment immediately. However, if you are unsure about the requirements for authorization, please wait 5-7 days and then contact your physician's office.  At that time, you will 108/72 mmHg 67 98.2 °F (36.8 °C) (Oral) 155 lb (70.308 kg)           Current Medications          This list is accurate as of: 5/3/17  3:01 PM.  Always use your most recent med list.                acetaminophen 325 MG Tabs   Take 2 tablets (650 mg total)

## (undated) NOTE — ED AVS SNAPSHOT
Thee Finney   MRN: [id number]    Department:  [facility] Emergency Department   Date of Visit:  7/16/2019           Disclosure     Insurance plans vary and the physician(s) referred by the ER may not be covered by your plan.  Please contact CARE PHYSICIAN AT ONCE OR RETURN IMMEDIATELY TO THE EMERGENCY DEPARTMENT. If you have been prescribed any medication(s), please fill your prescription right away and begin taking the medication(s) as directed.   If you believe that any of the medications

## (undated) NOTE — LETTER
2/12/2020          To Whom It May Concern:    Adry Osorio is currently under my medical care and may not return to work at this time. Please excuse Wilner Stinson for 2 days. She may return to school on 02/17/2020. Activity is restricted as follows: none.

## (undated) NOTE — MR AVS SNAPSHOT
[facility]  [address] [place] [address] [phone]               Thank you for choosing us for your health care visit with Radha Wheeler MD.  We are glad to serve you and happy to provide you with this summary of your visit.   Ple Sign up for [employer], your secure online medical record. my[employer] will allow you to access patient instructions from your recent visit,  view other health information, and more. To sign up or find more information, go to [email or web address]

## (undated) NOTE — IP AVS SNAPSHOT
[address]  [address] [facility] [phone]                Discharge Summary   7/24/3306    Gely Huang           Admission Information        Provider Department    3/29/2017 Kimani Trejo MD [facility] 5sw/ Bhaskar Jones                             STOP taking these medications     Liothyronine Sodium 5 MCG Tabs   Commonly known as:  CYTOMEL           naproxen 500 MG Tabs   Commonly known as:  NAPROSYN                Where to Get Your Medications      [facility] [address] (03/29/17)  8.3      Recent Hematology Lab Results (cont.)  (Last 3 results in the past 90 days)    Neutrophil % Lymphocyte % Monocyte % Eosinophil % Basophil % Prelim Neut Abs Final Neut Abs Lymphocyte Abso Monocyte Absolu Eosinophil Abso Basophil Abs can help with your Affordable Care Act coverage, as well as all types of Medicaid plans. To get signed up and covered, please call [phone] and ask to get set up for an insurance coverage that is in-network with [facility] Use:  Nausea/vomiting, acid reflux, low bowel motility, stomach pain   Most common side effects:  Depends on the specific medication but generally include: diarrhea, constipation, headache, allergic reaction (itching, rash, hives)   What to report to y

## (undated) NOTE — ED AVS SNAPSHOT
[facility] AND [facility] in [address] [address] [address]    Phone:  [phone]    Fax:  [address]   MRN: [id number]    Department:  [facility] AND [facility] in [address] [place] [address]   Date of Visit:  3 Insurance plans vary and the physician(s) referred by the Immediate Care may not be covered by your plan. It is possible that the physician may not participate in your health insurance plan.   This may result in a lower benefit level being available to yo CARE PHYSICIAN AT ONCE OR GO TO THE EMERGENCY DEPARTMENT. If you have been prescribed any medication(s), please fill your prescription right away and begin taking the medication(s) as directed.   If you believe that any of the medications or instructions - If you have concerns related to behavioral health issues or thoughts of harming yourself, contact [facility] at [phone].     - If you don’t have insurance, [facility] has partnered with [facility]

## (undated) NOTE — LETTER
3/24/3761              [place] Person        [id number] [place] [address]         Dear Sinan Kevin,    [facility] records indicate that the repeat thyroid blood tests ordered for you by Lupe Costello MD  have not been done.   If you have, in fact,

## (undated) NOTE — LETTER
Hospital Discharge Documentation    From: [address] Hospitalist's Office  Phone: [phone]    Patient discharged time/date: 3/31/2017  6:02 PM  Patient discharge disposition:  Home or Self Care       Discharge Summaries - D/C Summary      Discharge Meclizine HCl 12.5 MG Oral Tab  Take 2 tablets (25 mg total) by mouth 3 (three) times daily as needed for Dizziness.   Qty: 20 tablet Refills: 0      STOP taking these medications    naproxen 500 MG Oral Tab    Liothyronine Sodium (CYTOMEL) 5 MCG Oral T